# Patient Record
Sex: MALE | Race: BLACK OR AFRICAN AMERICAN | Employment: UNEMPLOYED | URBAN - METROPOLITAN AREA
[De-identification: names, ages, dates, MRNs, and addresses within clinical notes are randomized per-mention and may not be internally consistent; named-entity substitution may affect disease eponyms.]

---

## 2021-01-25 ENCOUNTER — TELEPHONE (OUTPATIENT)
Dept: SLEEP CENTER | Age: 61
End: 2021-01-25

## 2021-01-28 DIAGNOSIS — G47.33 OSA (OBSTRUCTIVE SLEEP APNEA): Primary | ICD-10-CM

## 2021-02-05 ENCOUNTER — HOSPITAL ENCOUNTER (OUTPATIENT)
Age: 61
Discharge: HOME | End: 2021-02-05
Payer: MEDICARE

## 2021-02-05 DIAGNOSIS — G47.33 OSA (OBSTRUCTIVE SLEEP APNEA): ICD-10-CM

## 2021-02-05 PROCEDURE — U0003 INFECTIOUS AGENT DETECTION BY NUCLEIC ACID (DNA OR RNA); SEVERE ACUTE RESPIRATORY SYNDROME CORONAVIRUS 2 (SARS-COV-2) (CORONAVIRUS DISEASE [COVID-19]), AMPLIFIED PROBE TECHNIQUE, MAKING USE OF HIGH THROUGHPUT TECHNOLOGIES AS DESCRIBED BY CMS-2020-01-R: HCPCS

## 2021-02-06 LAB
SARS-COV-2: NOT DETECTED
SOURCE: NORMAL

## 2021-02-12 ENCOUNTER — TELEPHONE (OUTPATIENT)
Dept: SLEEP CENTER | Age: 61
End: 2021-02-12

## 2021-02-13 ENCOUNTER — HOSPITAL ENCOUNTER (OUTPATIENT)
Dept: SLEEP CENTER | Age: 61
Discharge: HOME | End: 2021-02-13
Payer: MEDICARE

## 2021-02-13 DIAGNOSIS — G47.33 OSA (OBSTRUCTIVE SLEEP APNEA): Primary | ICD-10-CM

## 2021-02-13 PROCEDURE — 95811 POLYSOM 6/>YRS CPAP 4/> PARM: CPT | Performed by: INTERNAL MEDICINE

## 2021-02-13 PROCEDURE — 95811 POLYSOM 6/>YRS CPAP 4/> PARM: CPT

## 2021-02-13 PROCEDURE — 2700000000 HC OXYGEN THERAPY PER DAY

## 2021-02-21 NOTE — PROGRESS NOTES
[address]                               SLEEP STUDY REPORT    PATIENT NAME: Giovanni Crane                       :        1960  MED REC NO:   [id number]                            ROOM:  ACCOUNT NO:   [de-identified]                           ADMIT DATE: 2021  PROVIDER:     Aditya Cuevas MD    DATE OF STUDY:  2021    SPLIT NIGHT POLYSOMNOGRAM REPORT    LOCATION:  [address] [place] [address]. REFERRING PROVIDER:  Antony Duff CNP    AGE: 61 yrs       SEX: Male          HEIGHT: 5 ft  5 in         WEIGHT: 207 lbs          BMI: 34.4 kg/m2    NECK CIRCUMFERENCE: 19.25 in    Symptoms: Snoring, teeth grinding in sleep, difficulty falling/staying asleep, waking earlier than desired. The [place] Sleepiness Scale was 15 out of 24 (scores above or equal to 10 are suggestive of hypersomnolence). Indication: To reevaluate severity of obstructive sleep apnea and PAP therapy requirements. Medical History:   Obesity, diabetes, hypertension, hyperlipidemia, stroke, and known history of obstructive sleep apnea compliant with CPAP therapy but with breakthrough symptoms as above (unknown settings). Medications: Metformin, amlodipine, lisinopril, simvastatin, metoprolol, chlorthalidone. DESCRIPTION: This split night polysomnogram consisted of EEG, EOG, EMG and 2-lead ECG monitoring. Oronasal airflow (nasal pressure transducer, thermistor, and internal CPAP/bi-level PAP flow signal), chest and abdominal efforts by respiratory inductance plethysmography or polyvinylidene fluoride (PVDF) sensor, and pulse oximetry were monitored as well. Hypopneas were scored as at least a 30% reduction in amplitude of the semi-quantitative flow signal, associated with a 4% or greater oxygen desaturation. Respiratory effort related arousals (RERAs) were scored as at least a 30% reduction in amplitude of the semi-quantitative flow signal, associated with an EEG microarousal. During this study, a titration of positive airway pressure was begun after a baseline/diagnostic recording time of  207 minutes that included  68 minutes of sleep. FINDINGS:  SLEEP CONTINUITY AND SLEEP ARCHITECTURE:  Lights were turned off at 9:48 PM and lights were turned on at 5:05 AM. Total recording time was 437 minutes and total sleep time was 208 minutes. Sleep onset latency was increased at 46 minutes and REM latency was normal at 75 minutes. The amount of N1 sleep was 15% of total sleep time, or 22 minutes. The amount of N2 sleep was 61% of total sleep time, or 127 minutes. The amount of REM sleep was 17% of total sleep time, or 36 minutes. Slow wave sleep was 7% of total sleep time, or 14 minutes. The pre-treatment microarousal index was significantly increased to 57; arousals were related to respiratory events. The pre-treatment sleep efficiency was 33% and sleep efficiency after initiating positive airway pressure therapy was 61%. Sleep architecture improved with the use of positive airway pressure therapy, as compared to the pre-treatment baseline recording. RESPIRATORY MONITORING:  The pre-treatment portion of the study documented 40 obstructive apneas, 0 central apneas, 0 mixed apneas, 17 hypopneas, and 12 respiratory effort related arousals (RERAs) during the total recorded sleep time. The pre-treatment apnea/hypopnea index (AHI) was 50.3. The pre-treatment respiratory disturbance index (RDI includes RERAs) was 61. There was no REM or supine sleep during the diagnostic portion of the study. All sleep was in the left lateral position. The pre-treatment 4% oxygen desaturation index during sleep was 26.5. The lowest oxygen saturation during sleep was 81%. Oxygen saturations were less than or equal to 88% for 23 minutes or 34% of the total sleep time. Due to persistently low oxygen saturations less than 88%, the patient was started on 1 L/min of supplemental oxygen at 12:12 AM, which was maintained for the remainder of the diagnostic portion of the study. Moderate to loud snoring was noted. EEG:  No abnormal epileptiform activity was observed. ECG: The electrocardiogram documented normal sinus rhythm with few PVCs. The average heart rate during sleep was 61 beats per minute during the diagnostic portion of the study and 60 beats per minute during the treatment portion of the study. PERIODIC LIMB MOVEMENTS: Occasional periodic limb movements were noted. EMG/VIDEO MONITORING: Loss of REM atonia, bruxism, and parasomnias were not observed. TREATMENT: After the diagnostic portion of the study, CPAP was initiated at 10 cm of water pressure and titrated to 19 cm of water in order to abolish respiratory events. Respiratory events were virtually eliminated with the use of positive airway pressure therapy. Titration at the final CPAP of 19 cm of water resulted in an AHI of 1, a minimum oxyhemoglobin saturation of 87%, and an average oxyhemoglobin saturation of 91%. Time spent on CPAP 19 cmH2O included both supine and REM sleep, but not concurrently. No supplemental oxygen was added during the titration portion of the study. Of the 230 minutes of positive airway pressure titration time the patient was awake for 90 minutes. IMPRESSION:  1. This study demonstrated severe obstructive sleep apnea associated with significant hypoxia. The severity of this patient's sleep-disordered breathing was likely underestimated due to the absence of both supine and REM sleep in the diagnostic portion of the study, as well as poor sleep efficiency. 2. A split-night study was performed, and the patient's sleep-disordered breathing was successfully treated with CPAP therapy. 3. Subjectively, the patient reported sleeping better than usual and being willing to use PAP therapy at home on a chronic basis. 4. Periodic limb movements were occasional to frequent. RECOMMENDATIONS:    1. Auto-CPAP therapy at settings of 15-20 cm of water is recommended and should be ordered by the referring provider. Equipment ordering information is below. 2. Per insurance requirements, the patient should be seen in clinical follow up within 3 months of receiving auto-CPAP therapy in order to document adherence to therapy, assess efficacy of the prescribed settings (as based upon a residual AHI of less than or equal to 5 on the device's remote download), and evaluate resolution of sleep-related complaints. Once compliance with and efficacy of CPAP therapy has been confirmed, overnight pulse oximetry should be completed in order to verify normalization of oxygen saturations on the prescribed settings. 3. Clinical correlation of the periodic limb movements noted in the study is recommended, as further treatment may be warranted. 4. The patient should be strongly counseled against driving while drowsy. 5. Weight loss efforts should be encouraged, as the severity of obstructive sleep apnea may improve although no guarantee of cure can be made. 6. The patient may be referred to the [facility] for ongoing management of his severe obstructive sleep apnea and PAP therapy.      EQUIPMENT ORDERING INFORMATION:  DEVICE: Auto CPAP with heated humidification and a remote modem    SETTINGS: 15-20 cm of water     MASK TYPE: [employer] Simplus full face mask    MASK SIZE: Medium    SUPPLEMENTAL OXYGEN: None       Hilda Barnett MD    D: 02/19/2021 20:32:42       T: 02/19/2021 21:38:03     [email or web address]  Job#: [id number]     Doc#: [id number]    CC:

## 2021-03-11 ENCOUNTER — TELEPHONE (OUTPATIENT)
Dept: SLEEP CENTER | Age: 61
End: 2021-03-11